# Patient Record
Sex: MALE | Race: WHITE
[De-identification: names, ages, dates, MRNs, and addresses within clinical notes are randomized per-mention and may not be internally consistent; named-entity substitution may affect disease eponyms.]

---

## 2021-04-12 ENCOUNTER — HOSPITAL ENCOUNTER (EMERGENCY)
Dept: HOSPITAL 47 - EC | Age: 56
LOS: 1 days | Discharge: HOME | End: 2021-04-13
Payer: MEDICARE

## 2021-04-12 VITALS — RESPIRATION RATE: 18 BRPM

## 2021-04-12 DIAGNOSIS — R11.2: ICD-10-CM

## 2021-04-12 DIAGNOSIS — I10: ICD-10-CM

## 2021-04-12 DIAGNOSIS — U07.1: Primary | ICD-10-CM

## 2021-04-12 PROCEDURE — 83735 ASSAY OF MAGNESIUM: CPT

## 2021-04-12 PROCEDURE — 85025 COMPLETE CBC W/AUTO DIFF WBC: CPT

## 2021-04-12 PROCEDURE — 36415 COLL VENOUS BLD VENIPUNCTURE: CPT

## 2021-04-12 PROCEDURE — 83615 LACTATE (LD) (LDH) ENZYME: CPT

## 2021-04-12 PROCEDURE — 71045 X-RAY EXAM CHEST 1 VIEW: CPT

## 2021-04-12 PROCEDURE — 86140 C-REACTIVE PROTEIN: CPT

## 2021-04-12 PROCEDURE — 85730 THROMBOPLASTIN TIME PARTIAL: CPT

## 2021-04-12 PROCEDURE — 85610 PROTHROMBIN TIME: CPT

## 2021-04-12 PROCEDURE — 96365 THER/PROPH/DIAG IV INF INIT: CPT

## 2021-04-12 PROCEDURE — 80053 COMPREHEN METABOLIC PANEL: CPT

## 2021-04-12 PROCEDURE — 99285 EMERGENCY DEPT VISIT HI MDM: CPT

## 2021-04-12 PROCEDURE — 87635 SARS-COV-2 COVID-19 AMP PRB: CPT

## 2021-04-12 PROCEDURE — 96375 TX/PRO/DX INJ NEW DRUG ADDON: CPT

## 2021-04-12 PROCEDURE — 83605 ASSAY OF LACTIC ACID: CPT

## 2021-04-12 NOTE — ED
Nausea/Vomiting/Diarrhea HPI





- General


Chief complaint: Nausea/Vomiting/Diarrhea


Stated complaint: Body aches,Vomiting


Time Seen by Provider: 04/12/21 22:30


Source: patient


Mode of arrival: ambulatory


Limitations: no limitations





- History of Present Illness


Initial comments: 





Patient is a 55-year-old male with history of hypertension, presenting to the e

mergency department with complaint of body aches, fever and nausea and vomiting 

for the last 5 days.  Patient states for was family members are currently 

positive with Covid and he feels like this is the same.  He does admit to some 

mild shortness of breath, dry cough.  He states he has been trying some Tylenol 

and Motrin for his fevers.  His appetite has been low, is been trying to drink a

lot of water.  He denies any chest pains, no abdominal pains, some mild 

diarrhea.  He denies history of asthma or COPD, he is a nonsmoker.  He has no 

further complaints at this time.  Upon arrival to the ER, he is febrile to 

102.2, 96% on room air, rest of vitals normal.





- Related Data


                                  Previous Rx's











 Medication  Instructions  Recorded


 


Hydrocodone/Acetaminophen [Norco 1 each PO Q6HR PRN #30 tab 05/26/14





5-325]  


 


methylPREDNISolone Dose Pack 4 mg PO AS DIRECTED #21 package 05/26/14





[Medrol Dose Pack]  


 


Albuterol Inhaler [Ventolin Hfa 4 puff INHALATION RT-QID PRN #1 04/13/21





Inhaler] puff 


 


Dexamethasone [Decadron] 6 mg PO DAILY 7 Days #7 tablet 04/13/21


 


Ondansetron Odt [Zofran Odt] 4 mg PO Q8HR PRN #10 tab 04/13/21











                                    Allergies











Allergy/AdvReac Type Severity Reaction Status Date / Time


 


Penicillins Allergy  Dyspnea Verified 05/26/14 11:20


 


red dye Allergy  Unknown Verified 05/26/14 11:20














Review of Systems


ROS Statement: 


Those systems with pertinent positive or pertinent negative responses have been 

documented in the HPI.





ROS Other: All systems not noted in ROS Statement are negative.





Past Medical History


Additional Past Medical History / Comment(s): kidney stones


History of Any Multi-Drug Resistant Organisms: None Reported


Past Surgical History: No Surgical Hx Reported


Past Psychological History: No Psychological Hx Reported


Smoking Status: Never smoker


Past Alcohol Use History: None Reported


Past Drug Use History: None Reported





General Exam





- General Exam Comments


Initial Comments: 





GENERAL: 


Patient is well-developed and well-nourished.  Patient is nontoxic and in no 

acute distress.





HEAD: 


Atraumatic, normocephalic.





EYES:


Pupils equal round and reactive to light, extraocular movements intact, sclera 

anicteric, conjunctiva are normal.  Eyelids were unremarkable.





ENT: 


TMs normal, nares patent, oropharynx clear without exudates.  Moist mucous 

membranes.





NECK: 


Normal range of motion, supple without lymphadenopathy or JVD.





LUNGS:


Unlabored respirations.  Breath sounds clear to auscultation bilaterally and 

equal.  No wheezes rales or rhonchi.





HEART:


Regular rate and rhythm without murmurs, rubs or gallops.





ABDOMEN: 


Soft, nontender, normoactive bowel sounds.  No guarding, no rebound.  No masses 

appreciated.





: Deferred 





MUSCULOSKELETAL: 


Normal extremities with adequate strength and normal range of motion, no pitting

or edema.  No clubbing or cyanosis.





NEUROLOGICAL: 


Patient is alert and oriented x 3.  Motor and sensory are also intact.  Cranial 

nerves II through XII grossly intact.  Symmetrical smile.  Normal speech, normal

gait.   





PSYCH:


Normal mood, normal affect.





SKIN:


 Warm, Dry, normal turgor, no rashes or lesions noted.


Limitations: no limitations





Course


                                   Vital Signs











  04/12/21 04/12/21 04/13/21





  21:05 23:34 00:14


 


Temperature 102.2 F H 99.7 F H 98.9 F


 


Pulse Rate 80 74 67


 


Respiratory 18 18 18





Rate   


 


Blood Pressure 139/80 121/84 114/78


 


O2 Sat by Pulse 96 95 95





Oximetry   














  04/13/21 04/13/21





  00:17 01:13


 


Temperature 99.4 F 99.0 F


 


Pulse Rate 68 66


 


Respiratory 18 18





Rate  


 


Blood Pressure 127/84 113/75


 


O2 Sat by Pulse 96 92 L





Oximetry  














Medical Decision Making





- Medical Decision Making





Patient is a 55-year-old male presenting with Covid-like symptoms for the last 5

days.  4 was family members are Covid positive.  He did arrive febrile 102.5, 

96% on room air.  His exam is unremarkable.  Rapid Covid test is positive today.

 Labs show a normal white count, electrolytes are stable, LDH and CRP are 

elevated.  X-rays reveal minimal pleural reaction or atelectasis in the lateral 

left lung base.  Patient does meet qualifications for antiviral infusion 

secondary to BMI as well as age and hypertension.  He'll receive this infusion, 

no adverse side effects.  He is stable for discharge.  I recommended continuing 

with Tylenol and Motrin for fever and body aches.  I will prescribe him 

steroids, an inhaler as well as some Zofran for any additional nausea.  He is in

agreement with this plan of care.  Return parameters were discussed with the 

patient and he verbalized understanding.  Case discussed with Dr. Marte. 





- Lab Data


Result diagrams: 


                                 04/12/21 23:30





                                 04/12/21 23:30


                                   Lab Results











  04/12/21 04/12/21 04/12/21 Range/Units





  21:16 23:30 23:30 


 


WBC   6.2   (3.8-10.6)  k/uL


 


RBC   5.63   (4.30-5.90)  m/uL


 


Hgb   16.8   (13.0-17.5)  gm/dL


 


Hct   49.2   (39.0-53.0)  %


 


MCV   87.5   (80.0-100.0)  fL


 


MCH   29.8   (25.0-35.0)  pg


 


MCHC   34.1   (31.0-37.0)  g/dL


 


RDW   13.7   (11.5-15.5)  %


 


Plt Count   158   (150-450)  k/uL


 


MPV   7.9   


 


Neutrophils %   71   %


 


Lymphocytes %   20   %


 


Monocytes %   7   %


 


Eosinophils %   0   %


 


Basophils %   1   %


 


Neutrophils #   4.4   (1.3-7.7)  k/uL


 


Lymphocytes #   1.2   (1.0-4.8)  k/uL


 


Monocytes #   0.4   (0-1.0)  k/uL


 


Eosinophils #   0.0   (0-0.7)  k/uL


 


Basophils #   0.0   (0-0.2)  k/uL


 


PT    10.6  (9.0-12.0)  sec


 


INR    1.0  (<1.2)  


 


APTT    24.8  (22.0-30.0)  sec


 


Sodium     (137-145)  mmol/L


 


Potassium     (3.5-5.1)  mmol/L


 


Chloride     ()  mmol/L


 


Carbon Dioxide     (22-30)  mmol/L


 


Anion Gap     mmol/L


 


BUN     (9-20)  mg/dL


 


Creatinine     (0.66-1.25)  mg/dL


 


Est GFR (CKD-EPI)AfAm     (>60 ml/min/1.73 sqM)  


 


Est GFR (CKD-EPI)NonAf     (>60 ml/min/1.73 sqM)  


 


Glucose     (74-99)  mg/dL


 


Plasma Lactic Acid José     (0.7-2.0)  mmol/L


 


Calcium     (8.4-10.2)  mg/dL


 


Magnesium     (1.6-2.3)  mg/dL


 


Total Bilirubin     (0.2-1.3)  mg/dL


 


AST     (17-59)  U/L


 


ALT     (4-49)  U/L


 


Alkaline Phosphatase     ()  U/L


 


Lactate Dehydrogenase     (313-618)  U/L


 


C-Reactive Protein     (<10.0)  mg/L


 


Total Protein     (6.3-8.2)  g/dL


 


Albumin     (3.5-5.0)  g/dL


 


Coronavirus (PCR)  Detected A    (Not Detectd)  














  04/12/21 04/12/21 Range/Units





  23:30 23:30 


 


WBC    (3.8-10.6)  k/uL


 


RBC    (4.30-5.90)  m/uL


 


Hgb    (13.0-17.5)  gm/dL


 


Hct    (39.0-53.0)  %


 


MCV    (80.0-100.0)  fL


 


MCH    (25.0-35.0)  pg


 


MCHC    (31.0-37.0)  g/dL


 


RDW    (11.5-15.5)  %


 


Plt Count    (150-450)  k/uL


 


MPV    


 


Neutrophils %    %


 


Lymphocytes %    %


 


Monocytes %    %


 


Eosinophils %    %


 


Basophils %    %


 


Neutrophils #    (1.3-7.7)  k/uL


 


Lymphocytes #    (1.0-4.8)  k/uL


 


Monocytes #    (0-1.0)  k/uL


 


Eosinophils #    (0-0.7)  k/uL


 


Basophils #    (0-0.2)  k/uL


 


PT    (9.0-12.0)  sec


 


INR    (<1.2)  


 


APTT    (22.0-30.0)  sec


 


Sodium  136 L   (137-145)  mmol/L


 


Potassium  3.2 L   (3.5-5.1)  mmol/L


 


Chloride  95 L   ()  mmol/L


 


Carbon Dioxide  29   (22-30)  mmol/L


 


Anion Gap  12   mmol/L


 


BUN  27 H   (9-20)  mg/dL


 


Creatinine  1.15   (0.66-1.25)  mg/dL


 


Est GFR (CKD-EPI)AfAm  83   (>60 ml/min/1.73 sqM)  


 


Est GFR (CKD-EPI)NonAf  72   (>60 ml/min/1.73 sqM)  


 


Glucose  102 H   (74-99)  mg/dL


 


Plasma Lactic Acid José   1.2  (0.7-2.0)  mmol/L


 


Calcium  9.4   (8.4-10.2)  mg/dL


 


Magnesium  1.8   (1.6-2.3)  mg/dL


 


Total Bilirubin  0.7   (0.2-1.3)  mg/dL


 


AST  57   (17-59)  U/L


 


ALT  42   (4-49)  U/L


 


Alkaline Phosphatase  84   ()  U/L


 


Lactate Dehydrogenase  822 H   (313-618)  U/L


 


C-Reactive Protein  33.8 H   (<10.0)  mg/L


 


Total Protein  8.0   (6.3-8.2)  g/dL


 


Albumin  4.5   (3.5-5.0)  g/dL


 


Coronavirus (PCR)    (Not Detectd)  














- EKG Data


EKG Comments: 





Normal sinus rhythm, nonspecific ST and T-wave abnormalities, no signs of 

ischemia ischemia.  Ventricular rate 65, when necessary interval 134, .





Disposition


Clinical Impression: 


 COVID-19, Nausea & vomiting





Disposition: HOME SELF-CARE


Condition: Stable


Instructions (If sedation given, give patient instructions):  Coronavirus 

Disease 2019 (COVID-19)


Additional Instructions: 


Please return to the Emergency Department if symptoms worsen or any other 

concerns.


Take steroids as prescribed.  


Use inhaler for any shortness of breath cough, Zofran for an additional nausea.


Follow-up with your family doctor.





Prescriptions: 


Dexamethasone [Decadron] 6 mg PO DAILY 7 Days #7 tablet


Albuterol Inhaler [Ventolin Hfa Inhaler] 4 puff INHALATION RT-QID PRN #1 puff


 PRN Reason: Shortness Of Breath


Ondansetron Odt [Zofran Odt] 4 mg PO Q8HR PRN #10 tab


 PRN Reason: Nausea


Is patient prescribed a controlled substance at d/c from ED?: No


Referrals: 


Sang Gonzalez DO [Primary Care Provider] - 1-2 days

## 2021-04-12 NOTE — XR
EXAMINATION TYPE: XR chest 1V portable

 

DATE OF EXAM: 4/12/2021

 

COMPARISON: NONE

 

HISTORY: Chest pain

 

TECHNIQUE: Single view

 

FINDINGS: Heart and mediastinum are normal. There is small linear density left costophrenic angle. Th
ere are no hilar masses. Bony thorax is intact.

 

IMPRESSION: Minimal pleural reaction or atelectasis lateral left lung base. Normal heart.

## 2021-04-13 VITALS — HEART RATE: 66 BPM | TEMPERATURE: 99 F | SYSTOLIC BLOOD PRESSURE: 113 MMHG | DIASTOLIC BLOOD PRESSURE: 75 MMHG

## 2021-04-13 LAB
ALBUMIN SERPL-MCNC: 4.5 G/DL (ref 3.5–5)
ALP SERPL-CCNC: 84 U/L (ref 38–126)
ALT SERPL-CCNC: 42 U/L (ref 4–49)
ANION GAP SERPL CALC-SCNC: 12 MMOL/L
APTT BLD: 24.8 SEC (ref 22–30)
AST SERPL-CCNC: 57 U/L (ref 17–59)
BASOPHILS # BLD AUTO: 0 K/UL (ref 0–0.2)
BASOPHILS NFR BLD AUTO: 1 %
BUN SERPL-SCNC: 27 MG/DL (ref 9–20)
CALCIUM SPEC-MCNC: 9.4 MG/DL (ref 8.4–10.2)
CHLORIDE SERPL-SCNC: 95 MMOL/L (ref 98–107)
CO2 SERPL-SCNC: 29 MMOL/L (ref 22–30)
EOSINOPHIL # BLD AUTO: 0 K/UL (ref 0–0.7)
EOSINOPHIL NFR BLD AUTO: 0 %
ERYTHROCYTE [DISTWIDTH] IN BLOOD BY AUTOMATED COUNT: 5.63 M/UL (ref 4.3–5.9)
ERYTHROCYTE [DISTWIDTH] IN BLOOD: 13.7 % (ref 11.5–15.5)
GLUCOSE SERPL-MCNC: 102 MG/DL (ref 74–99)
HCT VFR BLD AUTO: 49.2 % (ref 39–53)
HGB BLD-MCNC: 16.8 GM/DL (ref 13–17.5)
INR PPP: 1 (ref ?–1.2)
LDH SPEC-CCNC: 822 U/L (ref 313–618)
LYMPHOCYTES # SPEC AUTO: 1.2 K/UL (ref 1–4.8)
LYMPHOCYTES NFR SPEC AUTO: 20 %
MAGNESIUM SPEC-SCNC: 1.8 MG/DL (ref 1.6–2.3)
MCH RBC QN AUTO: 29.8 PG (ref 25–35)
MCHC RBC AUTO-ENTMCNC: 34.1 G/DL (ref 31–37)
MCV RBC AUTO: 87.5 FL (ref 80–100)
MONOCYTES # BLD AUTO: 0.4 K/UL (ref 0–1)
MONOCYTES NFR BLD AUTO: 7 %
NEUTROPHILS # BLD AUTO: 4.4 K/UL (ref 1.3–7.7)
NEUTROPHILS NFR BLD AUTO: 71 %
PLATELET # BLD AUTO: 158 K/UL (ref 150–450)
POTASSIUM SERPL-SCNC: 3.2 MMOL/L (ref 3.5–5.1)
PROT SERPL-MCNC: 8 G/DL (ref 6.3–8.2)
PT BLD: 10.6 SEC (ref 9–12)
SODIUM SERPL-SCNC: 136 MMOL/L (ref 137–145)
WBC # BLD AUTO: 6.2 K/UL (ref 3.8–10.6)

## 2023-08-29 ENCOUNTER — HOSPITAL ENCOUNTER (OUTPATIENT)
Dept: HOSPITAL 47 - ORWHC2ENDO | Age: 58
Discharge: HOME | End: 2023-08-29
Attending: INTERNAL MEDICINE
Payer: MEDICARE

## 2023-08-29 VITALS — SYSTOLIC BLOOD PRESSURE: 124 MMHG | DIASTOLIC BLOOD PRESSURE: 69 MMHG | HEART RATE: 62 BPM

## 2023-08-29 VITALS — TEMPERATURE: 98.5 F

## 2023-08-29 VITALS — BODY MASS INDEX: 34 KG/M2

## 2023-08-29 VITALS — RESPIRATION RATE: 16 BRPM

## 2023-08-29 DIAGNOSIS — R19.5: ICD-10-CM

## 2023-08-29 DIAGNOSIS — Z12.11: Primary | ICD-10-CM

## 2023-08-29 DIAGNOSIS — I10: ICD-10-CM

## 2023-08-29 DIAGNOSIS — Z79.899: ICD-10-CM

## 2023-08-29 DIAGNOSIS — Z98.890: ICD-10-CM

## 2023-08-29 PROCEDURE — 88305 TISSUE EXAM BY PATHOLOGIST: CPT

## 2023-08-29 PROCEDURE — 45380 COLONOSCOPY AND BIOPSY: CPT

## 2023-08-29 NOTE — P.PCN
Date of Procedure: 08/29/23


Procedure(s) Performed: 


BRIEF HISTORY: Patient is a 57-year-old pleasant white male scheduled for an 

elective colonoscopy as a part of screening for colon cancer/positive cologuard.





PROCEDURE PERFORMED: Colonoscopy with biopsy. 





PREOPERATIVE DIAGNOSIS: Screening for colon cancer/positive cologuard. 





IV sedation per Anesthesia. 





PROCEDURE: After informed consent was obtained, the patient, was brought into 

the endoscopy unit. IV sedation was administered by Anesthesia under continuous 

monitoring.  Digital rectal examination was normal. Initially the Olympus CF-160

flexible video colonoscope was then inserted in the rectum, gradually advanced 

into the cecum without any difficulty. Careful examination was performed as the 

scope was gradually being withdrawn. Ileocecal valve and the appendiceal orifice

were visualized and appeared normal.  Prep was excellent. Mucosa of the cecum, 

ascending colon, transverse colon, descending colon, sigmoid colon, and rectum 

appeared normal.  There was a 3 mm; polyp that was removed by cold biopsy.  

Scattered sigmoid diverticulosis seen.  Retroflexion was performed in the rectum

and no lesions were seen. The patient tolerated the procedure well. 





IMPRESSION: 


3 mm; sigmoid polyp status post biopsy


Scattered sigmoid diverticulosis .





RECOMMENDATIONS:  Findings of this examination were discussed with the patient 

as well as his family.  He was advised to follow with the biopsy results and 

have a repeat colonoscopy in 10 years.

## 2024-06-19 ENCOUNTER — HOSPITAL ENCOUNTER (OUTPATIENT)
Dept: HOSPITAL 47 - RADXRYALE | Age: 59
Discharge: HOME | End: 2024-06-19
Attending: PHYSICIAN ASSISTANT
Payer: MEDICARE

## 2024-06-19 DIAGNOSIS — M54.6: Primary | ICD-10-CM

## 2024-06-19 PROCEDURE — 72072 X-RAY EXAM THORAC SPINE 3VWS: CPT

## 2024-06-20 NOTE — XR
EXAMINATION TYPE: XR thoracic spine complete

 

DATE OF EXAM: 6/19/2024

 

CLINICAL HISTORY: pain

 

TECHNIQUE: Frontal, lateral, and swimmer's view of thoracic spine are obtained.  

 

COMPARISON: None.

 

FINDINGS: Thoracic spine show satisfactory alignment without evidence of acute fracture or dislocatio
n.  Vertebral body heights are preserved. Moderate multilevel degenerative disc space narrowing and s
pondylosis.   Visualized ribs are unremarkable.   

 

IMPRESSION: No acute fracture or dislocation is seen in the thoracic spine.  ICD 10 NO FRACTURE, INIT
IAL EVALUATION